# Patient Record
Sex: MALE | Race: WHITE | NOT HISPANIC OR LATINO | URBAN - METROPOLITAN AREA
[De-identification: names, ages, dates, MRNs, and addresses within clinical notes are randomized per-mention and may not be internally consistent; named-entity substitution may affect disease eponyms.]

---

## 2017-11-15 ENCOUNTER — IMPORTED ENCOUNTER (OUTPATIENT)
Dept: URBAN - METROPOLITAN AREA CLINIC 31 | Facility: CLINIC | Age: 57
End: 2017-11-15

## 2017-12-21 ENCOUNTER — IMPORTED ENCOUNTER (OUTPATIENT)
Dept: URBAN - METROPOLITAN AREA CLINIC 31 | Facility: CLINIC | Age: 57
End: 2017-12-21

## 2017-12-21 PROBLEM — H04.123: Noted: 2017-12-21

## 2017-12-21 PROBLEM — H17.89: Noted: 2017-12-21

## 2017-12-21 PROBLEM — H40.003: Noted: 2017-12-21

## 2017-12-21 PROCEDURE — 92250 FUNDUS PHOTOGRAPHY W/I&R: CPT

## 2017-12-21 PROCEDURE — 92014 COMPRE OPH EXAM EST PT 1/>: CPT

## 2017-12-21 PROCEDURE — 92015 DETERMINE REFRACTIVE STATE: CPT

## 2017-12-21 PROCEDURE — 92133 CPTRZD OPH DX IMG PST SGM ON: CPT

## 2017-12-21 NOTE — PATIENT DISCUSSION
1.  Glaucoma suspect OU -   Large ONH's. No signs of glaucomatous damage to the optic nerve based on todays examination and testing. OCT 12/21/17   NOrmal OU. RNFL 96/99. Optos done. Continue to monitor. 2. Dry Eye OU:  Continue current management with Artificial Tears. 3.  S/P  Lasik: 2001 Junie Prom. 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 No TX 5. Disc Rx for nite driving but will defer for now. Pt reads without glasses. 6.   RTN 12/18 CE

## 2019-01-03 ENCOUNTER — IMPORTED ENCOUNTER (OUTPATIENT)
Dept: URBAN - METROPOLITAN AREA CLINIC 31 | Facility: CLINIC | Age: 59
End: 2019-01-03

## 2019-01-03 PROBLEM — H17.89: Noted: 2019-01-03

## 2019-01-03 PROBLEM — H04.123: Noted: 2019-01-03

## 2019-01-03 PROBLEM — H40.003: Noted: 2019-01-03

## 2019-01-03 PROCEDURE — 92014 COMPRE OPH EXAM EST PT 1/>: CPT

## 2019-01-03 PROCEDURE — 92015 DETERMINE REFRACTIVE STATE: CPT

## 2019-01-03 NOTE — PATIENT DISCUSSION
1.  Glaucoma suspect OU -   Large ONH's. No signs of glaucomatous damage to the optic nerve based on todays examination and testing. OCT 12/21/17   NOrmal OU. RNFL 96/99. Optos done. Continue to monitor. 2. Dry Eye OU:  Continue current management with Artificial Tears. 3.  S/P  Lasik: 2001 Dub Peeling. Dx Roxy Dz-  Blood disorder causes sore throats. No TX 5. Disc Rx for nite driving but will defer for now. Pt reads without glasses. 6.   RTN  1/19 CE/OCT nerve

## 2019-12-17 ENCOUNTER — IMPORTED ENCOUNTER (OUTPATIENT)
Dept: URBAN - METROPOLITAN AREA CLINIC 31 | Facility: CLINIC | Age: 59
End: 2019-12-17

## 2019-12-17 PROBLEM — H04.123: Noted: 2019-12-17

## 2019-12-17 PROBLEM — H40.003: Noted: 2019-12-17

## 2019-12-17 PROBLEM — H17.89: Noted: 2019-12-17

## 2019-12-17 PROBLEM — H40.013: Noted: 2019-12-17

## 2019-12-17 PROCEDURE — 92015 DETERMINE REFRACTIVE STATE: CPT

## 2019-12-17 PROCEDURE — 92014 COMPRE OPH EXAM EST PT 1/>: CPT

## 2019-12-17 PROCEDURE — 92133 CPTRZD OPH DX IMG PST SGM ON: CPT

## 2019-12-17 NOTE — PATIENT DISCUSSION
1.  Glaucoma suspect OU -   Large ONH's. No signs of glaucomatous damage to the optic nerve based on todays examination and testing. OCT 12/17/19   NOrmal OU. RNFL 94/97. Optos done. Continue to monitor. 2. Dry Eye OU:  Continue current management with Artificial Tears. 3.  S/P  Lasik: 2001 Isai Sorenson. Dx Roxy Dz-  Blood disorder causes sore throats. No TX 5. Disc Rx for nite driving but will defer for now. Pt reads without glasses. 6.   RTN  12/20 CE/OCT nerve--comes in every 2 yrs

## 2021-01-21 ENCOUNTER — IMPORTED ENCOUNTER (OUTPATIENT)
Dept: URBAN - METROPOLITAN AREA CLINIC 31 | Facility: CLINIC | Age: 61
End: 2021-01-21

## 2021-01-21 PROBLEM — H04.123: Noted: 2021-01-21

## 2021-01-21 PROBLEM — H40.013: Noted: 2021-01-21

## 2021-01-21 PROBLEM — H40.003: Noted: 2021-01-21

## 2021-01-21 PROBLEM — H17.89: Noted: 2021-01-21

## 2021-01-21 PROCEDURE — 92133 CPTRZD OPH DX IMG PST SGM ON: CPT

## 2021-01-21 PROCEDURE — 92014 COMPRE OPH EXAM EST PT 1/>: CPT

## 2021-01-21 PROCEDURE — 92015 DETERMINE REFRACTIVE STATE: CPT

## 2021-01-21 NOTE — PATIENT DISCUSSION
1.  Glaucoma suspect OU -   Large ONH's. No signs of glaucomatous damage to the optic nerve based on todays examination and testing. OCT 1/21/21   NOrmal OU. RNFL 95/96. Optos done. Continue to monitor. 2. Dry Eye OU:  Continue current management with Artificial Tears. 3.  S/P  Lasik: 2001 Roger Guillory. Dx Roxy Dz-  Blood disorder causes sore throats. No TX 5. Disc Rx for nite driving but will defer for now. Pt reads without glasses. 6.   RTN  12/22 CE/OCT nerve--comes in every 2 yrs

## 2022-02-16 ENCOUNTER — IMPORTED ENCOUNTER (OUTPATIENT)
Dept: URBAN - METROPOLITAN AREA CLINIC 31 | Facility: CLINIC | Age: 62
End: 2022-02-16

## 2022-02-16 PROBLEM — H04.123: Noted: 2022-02-16

## 2022-02-16 PROBLEM — Z98.890: Noted: 2022-02-16

## 2022-02-16 PROBLEM — H17.89: Noted: 2022-02-16

## 2022-02-16 PROBLEM — H40.003: Noted: 2022-02-16

## 2022-02-16 PROBLEM — H40.013: Noted: 2022-02-16

## 2022-02-16 PROCEDURE — 99214 OFFICE O/P EST MOD 30 MIN: CPT

## 2022-02-16 PROCEDURE — 92133 CPTRZD OPH DX IMG PST SGM ON: CPT

## 2022-02-16 PROCEDURE — 92015 DETERMINE REFRACTIVE STATE: CPT

## 2022-02-16 NOTE — PATIENT DISCUSSION
1.  Glaucoma suspect OU -   Large ONH's. No signs of glaucomatous damage to the optic nerve based on todays examination and testing. OCT 2/16/22  NOrmal OU. RNFL 98/99 from 95/96. Optos done. Continue to monitor. 2. Dry Eye OU:  Continue current management with Artificial Tears. 3.  S/P  Lasik: 2001 Az Salas. Dx Roxy Dz-  Blood disorder causes sore throats. No TX 5. Disc Rx for nite driving but will defer for now. Pt reads without glasses. 6.   RTN  2/23 CE/OCT nerve--comes in every 2 yrs

## 2022-04-02 ASSESSMENT — PACHYMETRY
OD_CT_UM: 575
OS_CT_UM: 578

## 2022-04-02 ASSESSMENT — TONOMETRY
OS_IOP_MMHG: 17
OS_IOP_MMHG: 19
OD_IOP_MMHG: 19
OS_IOP_MMHG: 18
OD_IOP_MMHG: 17
OD_IOP_MMHG: 18
OS_IOP_MMHG: 18
OD_IOP_MMHG: 17
OS_IOP_MMHG: 19
OD_IOP_MMHG: 17

## 2022-04-02 ASSESSMENT — VISUAL ACUITY
OS_CC: 20/30-1
OS_CC: 20/30-1
OD_SC: 20/40
OD_CC: 20/25-1
OD_CC: 20/20
OS_SC: 20/80
OD_SC: 20/80
OD_CC: 20/30
OS_CC: 20/30+3
OS_SC: 20/80
OS_SC: 20/50+3
OD_SC: 20/40-1
OD_CC: 20/30-2
OS_SC: 20/30+2
OD_CC: 20/30+4
OD_SC: 20/50+3
OD_SC: 20/80
OS_SC: 20/30-2
OS_CC: 20/25-2
OS_CC: 20/30-1

## 2022-06-04 ENCOUNTER — TELEPHONE ENCOUNTER (OUTPATIENT)
Dept: URBAN - METROPOLITAN AREA CLINIC 68 | Facility: CLINIC | Age: 62
End: 2022-06-04

## 2022-06-04 RX ORDER — POLYETHYLENE GLYOCOL 3350, SODIUM CHLORIDE, SODIUM BICARBONATE AND POTASSIUM CHLORIDE 420; 11.2; 5.72; 1.48 G/4L; G/4L; G/4L; G/4L
POWDER, FOR SOLUTION NASOGASTRIC; ORAL AS DIRECTED
Qty: 1 | Refills: 0 | OUTPATIENT
Start: 2012-10-03 | End: 2012-10-04

## 2022-06-05 ENCOUNTER — TELEPHONE ENCOUNTER (OUTPATIENT)
Dept: URBAN - METROPOLITAN AREA CLINIC 68 | Facility: CLINIC | Age: 62
End: 2022-06-05

## 2022-06-25 ENCOUNTER — TELEPHONE ENCOUNTER (OUTPATIENT)
Age: 62
End: 2022-06-25

## 2022-06-25 RX ORDER — SODIUM SULFATE, POTASSIUM SULFATE, MAGNESIUM SULFATE 17.5; 3.13; 1.6 G/ML; G/ML; G/ML
SOLUTION, CONCENTRATE ORAL AS DIRECTED
Qty: 1 | Refills: 0 | OUTPATIENT
Start: 2012-10-02 | End: 2012-10-03

## 2022-06-26 ENCOUNTER — TELEPHONE ENCOUNTER (OUTPATIENT)
Age: 62
End: 2022-06-26

## 2024-03-12 ENCOUNTER — COMPREHENSIVE EXAM (OUTPATIENT)
Dept: URBAN - METROPOLITAN AREA CLINIC 34 | Facility: CLINIC | Age: 64
End: 2024-03-12

## 2024-03-12 DIAGNOSIS — H40.013: ICD-10-CM

## 2024-03-12 DIAGNOSIS — H04.123: ICD-10-CM

## 2024-03-12 PROCEDURE — 92014 COMPRE OPH EXAM EST PT 1/>: CPT

## 2024-03-12 PROCEDURE — 92083 EXTENDED VISUAL FIELD XM: CPT

## 2024-03-12 PROCEDURE — 92015 DETERMINE REFRACTIVE STATE: CPT

## 2024-03-12 PROCEDURE — 92250 FUNDUS PHOTOGRAPHY W/I&R: CPT

## 2024-03-12 ASSESSMENT — TONOMETRY
OD_IOP_MMHG: 17
OS_IOP_MMHG: 17

## 2024-03-12 ASSESSMENT — VISUAL ACUITY
OD_SC: J5
OS_SC: 20/25
OS_SC: J5
OD_SC: 20/40

## 2025-06-23 ENCOUNTER — COMPREHENSIVE EXAM (OUTPATIENT)
Age: 65
End: 2025-06-23

## 2025-06-23 DIAGNOSIS — H40.013: ICD-10-CM

## 2025-06-23 DIAGNOSIS — H52.4: ICD-10-CM

## 2025-06-23 PROCEDURE — 92015 DETERMINE REFRACTIVE STATE: CPT

## 2025-06-23 PROCEDURE — 92014 COMPRE OPH EXAM EST PT 1/>: CPT

## 2025-06-23 PROCEDURE — 92133 CPTRZD OPH DX IMG PST SGM ON: CPT
